# Patient Record
Sex: MALE | Race: WHITE
[De-identification: names, ages, dates, MRNs, and addresses within clinical notes are randomized per-mention and may not be internally consistent; named-entity substitution may affect disease eponyms.]

---

## 2023-04-03 ENCOUNTER — HOSPITAL ENCOUNTER (EMERGENCY)
Dept: HOSPITAL 33 - ED | Age: 68
LOS: 1 days | Discharge: ANOTHER HOSPITAL | End: 2023-04-04
Payer: MEDICARE

## 2023-04-03 DIAGNOSIS — Z59.00: ICD-10-CM
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
## 2023-04-03 DIAGNOSIS — Z59.86: ICD-10-CM

## 2023-04-03 DIAGNOSIS — M79.645: ICD-10-CM

## 2023-04-03 DIAGNOSIS — S01.81XA: ICD-10-CM

## 2023-04-03 DIAGNOSIS — S72.142A: Primary | ICD-10-CM

## 2023-04-03 LAB
ALBUMIN SERPL-MCNC: 4.5 G/DL (ref 3.5–5)
ALP SERPL-CCNC: 81 U/L (ref 38–126)
ALT SERPL-CCNC: 33 U/L (ref 0–50)
ANION GAP SERPL CALC-SCNC: 15.6 MEQ/L (ref 5–15)
AST SERPL QL: 64 U/L (ref 17–59)
BILIRUB BLD-MCNC: 0.7 MG/DL (ref 0.2–1.3)
BUN SERPL-MCNC: 24 MG/DL (ref 9–20)
CALCIUM SPEC-MCNC: 8.4 MG/DL (ref 8.4–10.2)
CHLORIDE SERPL-SCNC: 107 MMOL/L (ref 98–107)
CO2 SERPL-SCNC: 23 MMOL/L (ref 22–30)
CREAT SERPL-MCNC: 1.11 MG/DL (ref 0.66–1.25)
GFR SERPLBLD BASED ON 1.73 SQ M-ARVRAT: > 60 ML/MIN
GLUCOSE SERPL-MCNC: 135 MG/DL (ref 74–106)
HCT VFR BLD AUTO: 38.4 % (ref 42–50)
HGB BLD-MCNC: 12.9 G/DL (ref 12.5–18)
MCH RBC QN AUTO: 31.1 PG (ref 26–32)
MCHC RBC AUTO-ENTMCNC: 33.6 G/DL (ref 32–36)
PLATELET # BLD AUTO: 270 X10^3/UL (ref 150–450)
POTASSIUM SERPLBLD-SCNC: 3.9 MMOL/L (ref 3.5–5.1)
PROT SERPL-MCNC: 7.7 G/DL (ref 6.3–8.2)
RBC # BLD AUTO: 4.15 X10^6/UL (ref 4.1–5.6)
SODIUM SERPL-SCNC: 142 MMOL/L (ref 137–145)
WBC # BLD AUTO: 12.4 X10^3/UL (ref 4–10.5)

## 2023-04-03 PROCEDURE — 80053 COMPREHEN METABOLIC PANEL: CPT

## 2023-04-03 PROCEDURE — 96374 THER/PROPH/DIAG INJ IV PUSH: CPT

## 2023-04-03 PROCEDURE — 96376 TX/PRO/DX INJ SAME DRUG ADON: CPT

## 2023-04-03 PROCEDURE — 36415 COLL VENOUS BLD VENIPUNCTURE: CPT

## 2023-04-03 PROCEDURE — 73120 X-RAY EXAM OF HAND: CPT

## 2023-04-03 PROCEDURE — 36000 PLACE NEEDLE IN VEIN: CPT

## 2023-04-03 PROCEDURE — 73502 X-RAY EXAM HIP UNI 2-3 VIEWS: CPT

## 2023-04-03 PROCEDURE — 85027 COMPLETE CBC AUTOMATED: CPT

## 2023-04-03 PROCEDURE — 12011 RPR F/E/E/N/L/M 2.5 CM/<: CPT

## 2023-04-03 PROCEDURE — 99285 EMERGENCY DEPT VISIT HI MDM: CPT

## 2023-04-03 SDOH — ECONOMIC STABILITY - HOUSING INSECURITY: HOMELESSNESS UNSPECIFIED: Z59.00

## 2023-04-03 SDOH — ECONOMIC STABILITY - INCOME SECURITY: FINANCIAL INSECURITY: Z59.86
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
## 2023-04-04 VITALS — DIASTOLIC BLOOD PRESSURE: 107 MMHG | SYSTOLIC BLOOD PRESSURE: 186 MMHG | HEART RATE: 69 BPM | OXYGEN SATURATION: 93 %

## 2023-04-04 NOTE — XRAY
Indication: Thumb pain following fall.



Comparison: None



2 View left hand demonstrates osteopenia, mild/moderate degenerative changes

all IP/MCP joints, and advanced degenerative changes 1st metacarpal

multangular articulation.  No other bony, articular, or soft tissue

abnormalities.

## 2023-04-04 NOTE — XRAY
Indication: Pain following fall.



Comparison: None



AP pelvis and 2 view left hip demonstrates moderately angulated left femur

neck acute fracture.  Elsewhere osteopenia and moderate lower lumbar

degenerative changes.  No other bony, articular, or soft tissue abnormalities.